# Patient Record
Sex: MALE | Race: WHITE
[De-identification: names, ages, dates, MRNs, and addresses within clinical notes are randomized per-mention and may not be internally consistent; named-entity substitution may affect disease eponyms.]

---

## 2019-01-23 NOTE — NUR
01/23/19 1724 Mihaela Castillo
AT 1620 CHECKED DRSG. DRSG DRY/INTACT, REWRAPPED ACE WRAP SNUGLY ON
CHEST. PT TOLERATED WELL.

## 2019-06-12 ENCOUNTER — HOSPITAL ENCOUNTER (OUTPATIENT)
Dept: HOSPITAL 95 - ORSCSDS | Age: 69
Discharge: HOME | End: 2019-06-12
Attending: INTERNAL MEDICINE
Payer: MEDICARE

## 2019-06-12 VITALS — HEIGHT: 70 IN | BODY MASS INDEX: 26.45 KG/M2 | WEIGHT: 184.75 LBS

## 2019-06-12 DIAGNOSIS — Z79.899: ICD-10-CM

## 2019-06-12 DIAGNOSIS — I10: ICD-10-CM

## 2019-06-12 DIAGNOSIS — D12.4: ICD-10-CM

## 2019-06-12 DIAGNOSIS — Z86.010: Primary | ICD-10-CM

## 2019-06-12 DIAGNOSIS — E78.00: ICD-10-CM

## 2019-06-12 DIAGNOSIS — K57.30: ICD-10-CM

## 2019-06-12 DIAGNOSIS — Z87.891: ICD-10-CM

## 2019-06-12 DIAGNOSIS — D64.9: ICD-10-CM

## 2019-06-12 DIAGNOSIS — K21.9: ICD-10-CM

## 2019-06-12 DIAGNOSIS — G47.33: ICD-10-CM

## 2019-06-12 DIAGNOSIS — I25.10: ICD-10-CM

## 2019-06-12 DIAGNOSIS — D12.3: ICD-10-CM

## 2019-06-12 DIAGNOSIS — Z86.73: ICD-10-CM

## 2019-06-12 DIAGNOSIS — Z79.01: ICD-10-CM

## 2019-06-12 PROCEDURE — 0DBL8ZX EXCISION OF TRANSVERSE COLON, VIA NATURAL OR ARTIFICIAL OPENING ENDOSCOPIC, DIAGNOSTIC: ICD-10-PCS | Performed by: INTERNAL MEDICINE

## 2019-06-12 PROCEDURE — 0DBM8ZX EXCISION OF DESCENDING COLON, VIA NATURAL OR ARTIFICIAL OPENING ENDOSCOPIC, DIAGNOSTIC: ICD-10-PCS | Performed by: INTERNAL MEDICINE

## 2020-01-26 ENCOUNTER — HOSPITAL ENCOUNTER (OUTPATIENT)
Dept: HOSPITAL 95 - LAB | Age: 70
End: 2020-01-26
Attending: INTERNAL MEDICINE
Payer: MEDICARE

## 2020-01-26 DIAGNOSIS — R93.1: ICD-10-CM

## 2020-01-26 DIAGNOSIS — R07.9: Primary | ICD-10-CM

## 2020-01-26 LAB
ANION GAP SERPL CALCULATED.4IONS-SCNC: 6 MMOL/L (ref 6–16)
BASOPHILS # BLD AUTO: 0.03 K/MM3 (ref 0–0.23)
BASOPHILS NFR BLD AUTO: 0 % (ref 0–2)
BUN SERPL-MCNC: 16 MG/DL (ref 8–24)
CALCIUM SERPL-MCNC: 8.9 MG/DL (ref 8.5–10.1)
CHLORIDE SERPL-SCNC: 108 MMOL/L (ref 98–108)
CO2 SERPL-SCNC: 26 MMOL/L (ref 21–32)
CREAT SERPL-MCNC: 1.51 MG/DL (ref 0.6–1.2)
DEPRECATED RDW RBC AUTO: 41.1 FL (ref 35.1–46.3)
EOSINOPHIL # BLD AUTO: 0.16 K/MM3 (ref 0–0.68)
EOSINOPHIL NFR BLD AUTO: 2 % (ref 0–6)
ERYTHROCYTE [DISTWIDTH] IN BLOOD BY AUTOMATED COUNT: 13.5 % (ref 11.7–14.2)
GLUCOSE SERPL-MCNC: 81 MG/DL (ref 70–99)
HCT VFR BLD AUTO: 36.5 % (ref 37–53)
HGB BLD-MCNC: 12.5 G/DL (ref 13.5–17.5)
IMM GRANULOCYTES # BLD AUTO: 0.06 K/MM3 (ref 0–0.1)
IMM GRANULOCYTES NFR BLD AUTO: 1 % (ref 0–1)
LYMPHOCYTES # BLD AUTO: 2.37 K/MM3 (ref 0.84–5.2)
LYMPHOCYTES NFR BLD AUTO: 33 % (ref 21–46)
MCHC RBC AUTO-ENTMCNC: 34.2 G/DL (ref 31.5–36.5)
MCV RBC AUTO: 84 FL (ref 80–100)
MONOCYTES # BLD AUTO: 0.58 K/MM3 (ref 0.16–1.47)
MONOCYTES NFR BLD AUTO: 8 % (ref 4–13)
NEUTROPHILS # BLD AUTO: 3.91 K/MM3 (ref 1.96–9.15)
NEUTROPHILS NFR BLD AUTO: 55 % (ref 41–73)
NRBC # BLD AUTO: 0 K/MM3 (ref 0–0.02)
NRBC BLD AUTO-RTO: 0 /100 WBC (ref 0–0.2)
PLATELET # BLD AUTO: 156 K/MM3 (ref 150–400)
POTASSIUM SERPL-SCNC: 3.7 MMOL/L (ref 3.5–5.5)
PROTHROMBIN TIME: 11.5 SEC (ref 9.7–11.5)
SODIUM SERPL-SCNC: 140 MMOL/L (ref 136–145)

## 2020-01-27 ENCOUNTER — HOSPITAL ENCOUNTER (OUTPATIENT)
Dept: HOSPITAL 95 - MHTC | Age: 70
Discharge: HOME | End: 2020-01-27
Attending: INTERNAL MEDICINE
Payer: MEDICARE

## 2020-01-27 VITALS — WEIGHT: 200.4 LBS | HEIGHT: 70 IN | BODY MASS INDEX: 28.69 KG/M2

## 2020-01-27 DIAGNOSIS — Z79.899: ICD-10-CM

## 2020-01-27 DIAGNOSIS — I10: ICD-10-CM

## 2020-01-27 DIAGNOSIS — Z87.891: ICD-10-CM

## 2020-01-27 DIAGNOSIS — Z88.8: ICD-10-CM

## 2020-01-27 DIAGNOSIS — Z88.5: ICD-10-CM

## 2020-01-27 DIAGNOSIS — I25.82: ICD-10-CM

## 2020-01-27 DIAGNOSIS — I25.119: Primary | ICD-10-CM

## 2020-01-27 DIAGNOSIS — I25.729: ICD-10-CM

## 2020-01-27 DIAGNOSIS — Z88.6: ICD-10-CM

## 2020-01-27 DIAGNOSIS — E78.5: ICD-10-CM

## 2020-01-27 PROCEDURE — 4A023N7 MEASUREMENT OF CARDIAC SAMPLING AND PRESSURE, LEFT HEART, PERCUTANEOUS APPROACH: ICD-10-PCS | Performed by: INTERNAL MEDICINE

## 2020-01-27 PROCEDURE — B201YZZ PLAIN RADIOGRAPHY OF MULTIPLE CORONARY ARTERIES USING OTHER CONTRAST: ICD-10-PCS | Performed by: INTERNAL MEDICINE

## 2020-01-27 PROCEDURE — C1894 INTRO/SHEATH, NON-LASER: HCPCS

## 2020-01-27 PROCEDURE — B203YZZ PLAIN RADIOGRAPHY OF MULTIPLE CORONARY ARTERY BYPASS GRAFTS USING OTHER CONTRAST: ICD-10-PCS | Performed by: INTERNAL MEDICINE

## 2020-01-27 PROCEDURE — C1769 GUIDE WIRE: HCPCS

## 2020-01-27 NOTE — NUR
DISCHARGE
PT DRESSED SELF WITH ASSISSENCE FROM SPOUSE. PT DENIES ANY PAIN. VSS. R
FEMORAL ACCESS SITE WITH UTE DRESSING. NO BLEEDING, OOZING OR HEMATOMA
NOTED. PT REPORTS TENDERNESS BUT DENIES ANY PAIN TO AREA. PT AND SPOUSE STATE
THEIR UNDERSTANDING OF SITE CARE INSTRUCTIONS AND DISCHARGE INSTRUCTIONS AND
DENY ANY QUESTIONS OR CONCERNS. IV DCD WITH CATH INTACT. PT TO FRONT ENTERENCE
VIA WHEELCHAIR AND ASSISTED INTO VEHICLE.

## 2020-01-27 NOTE — NUR
PT AMBULATES TO RESTROOM MULTIPLE TIMES. PT REPORTS URINATING WITHOUT
DIFFICULTIES. PT ALSO REPORTS HAVING A BM WITHOUT DIFFICULITIES. VS. NO
BLEEDING, OOZING OR HEMATOMA NOTED AT R FEMORAL ACCESS SITE. WILL CONTINUR TO
MONITOR.

## 2022-07-21 ENCOUNTER — HOSPITAL ENCOUNTER (INPATIENT)
Dept: HOSPITAL 95 - ER | Age: 72
LOS: 3 days | Discharge: HOME | DRG: 378 | End: 2022-07-24
Attending: COUNSELOR | Admitting: HOSPITALIST
Payer: COMMERCIAL

## 2022-07-21 VITALS — BODY MASS INDEX: 26.51 KG/M2 | HEIGHT: 69 IN | WEIGHT: 179.02 LBS

## 2022-07-21 DIAGNOSIS — Z88.6: ICD-10-CM

## 2022-07-21 DIAGNOSIS — I10: ICD-10-CM

## 2022-07-21 DIAGNOSIS — Z88.8: ICD-10-CM

## 2022-07-21 DIAGNOSIS — D68.59: ICD-10-CM

## 2022-07-21 DIAGNOSIS — G89.4: ICD-10-CM

## 2022-07-21 DIAGNOSIS — Z87.891: ICD-10-CM

## 2022-07-21 DIAGNOSIS — D62: ICD-10-CM

## 2022-07-21 DIAGNOSIS — Z98.1: ICD-10-CM

## 2022-07-21 DIAGNOSIS — F11.20: ICD-10-CM

## 2022-07-21 DIAGNOSIS — E78.5: ICD-10-CM

## 2022-07-21 DIAGNOSIS — Z98.890: ICD-10-CM

## 2022-07-21 DIAGNOSIS — Z95.1: ICD-10-CM

## 2022-07-21 DIAGNOSIS — F41.9: ICD-10-CM

## 2022-07-21 DIAGNOSIS — D72.828: ICD-10-CM

## 2022-07-21 DIAGNOSIS — Z79.01: ICD-10-CM

## 2022-07-21 DIAGNOSIS — Z95.4: ICD-10-CM

## 2022-07-21 DIAGNOSIS — Z90.49: ICD-10-CM

## 2022-07-21 DIAGNOSIS — Z66: ICD-10-CM

## 2022-07-21 DIAGNOSIS — K31.82: Primary | ICD-10-CM

## 2022-07-21 DIAGNOSIS — K59.09: ICD-10-CM

## 2022-07-21 DIAGNOSIS — Z79.899: ICD-10-CM

## 2022-07-21 DIAGNOSIS — Z88.5: ICD-10-CM

## 2022-07-21 DIAGNOSIS — Z90.89: ICD-10-CM

## 2022-07-21 DIAGNOSIS — I25.10: ICD-10-CM

## 2022-07-21 LAB
ALBUMIN SERPL BCP-MCNC: 3.7 G/DL (ref 3.4–5)
ALBUMIN/GLOB SERPL: 1 {RATIO} (ref 0.8–1.8)
ALT SERPL W P-5'-P-CCNC: 21 U/L (ref 12–78)
ANION GAP SERPL CALCULATED.4IONS-SCNC: 11 MMOL/L (ref 6–16)
AST SERPL W P-5'-P-CCNC: 26 U/L (ref 12–37)
BASOPHILS # BLD AUTO: 0.04 K/MM3 (ref 0–0.23)
BASOPHILS NFR BLD AUTO: 0 % (ref 0–2)
BILIRUB SERPL-MCNC: 0.9 MG/DL (ref 0.1–1)
BUN SERPL-MCNC: 25 MG/DL (ref 8–24)
CALCIUM SERPL-MCNC: 8.3 MG/DL (ref 8.5–10.1)
CHLORIDE SERPL-SCNC: 102 MMOL/L (ref 98–108)
CO2 SERPL-SCNC: 20 MMOL/L (ref 21–32)
CREAT SERPL-MCNC: 1.25 MG/DL (ref 0.6–1.2)
DEPRECATED RDW RBC AUTO: 42.2 FL (ref 35.1–46.3)
EOSINOPHIL # BLD AUTO: 0.04 K/MM3 (ref 0–0.68)
EOSINOPHIL NFR BLD AUTO: 0 % (ref 0–6)
ERYTHROCYTE [DISTWIDTH] IN BLOOD BY AUTOMATED COUNT: 14.3 % (ref 11.7–14.2)
GLOBULIN SER CALC-MCNC: 3.6 G/DL (ref 2.2–4)
GLUCOSE SERPL-MCNC: 147 MG/DL (ref 70–99)
HCT VFR BLD AUTO: 29.5 % (ref 37–53)
HCT VFR BLD AUTO: 30.8 % (ref 37–53)
HCT VFR BLD AUTO: 34 % (ref 37–53)
HGB BLD-MCNC: 10.5 G/DL (ref 13.5–17.5)
HGB BLD-MCNC: 10.6 G/DL (ref 13.5–17.5)
HGB BLD-MCNC: 11.8 G/DL (ref 13.5–17.5)
IMM GRANULOCYTES # BLD AUTO: 0.06 K/MM3 (ref 0–0.1)
IMM GRANULOCYTES NFR BLD AUTO: 1 % (ref 0–1)
KETONES UR STRIP-MCNC: (no result) MG/DL
LYMPHOCYTES # BLD AUTO: 2.11 K/MM3 (ref 0.84–5.2)
LYMPHOCYTES NFR BLD AUTO: 18 % (ref 21–46)
MCHC RBC AUTO-ENTMCNC: 34.7 G/DL (ref 31.5–36.5)
MCV RBC AUTO: 82 FL (ref 80–100)
MONOCYTES # BLD AUTO: 0.93 K/MM3 (ref 0.16–1.47)
MONOCYTES NFR BLD AUTO: 8 % (ref 4–13)
NEUTROPHILS # BLD AUTO: 8.9 K/MM3 (ref 1.96–9.15)
NEUTROPHILS NFR BLD AUTO: 74 % (ref 41–73)
NRBC # BLD AUTO: 0 K/MM3 (ref 0–0.02)
NRBC BLD AUTO-RTO: 0 /100 WBC (ref 0–0.2)
PLATELET # BLD AUTO: 266 K/MM3 (ref 150–400)
POTASSIUM SERPL-SCNC: 4 MMOL/L (ref 3.5–5.5)
PROT SERPL-MCNC: 7.3 G/DL (ref 6.4–8.2)
PROT UR STRIP-MCNC: (no result) MG/DL
PROTHROMBIN TIME: 52.4 SEC (ref 9.7–11.5)
SODIUM SERPL-SCNC: 133 MMOL/L (ref 136–145)
SP GR SPEC: 1.02 (ref 1–1.02)
UROBILINOGEN UR STRIP-MCNC: (no result) MG/DL

## 2022-07-21 PROCEDURE — A9270 NON-COVERED ITEM OR SERVICE: HCPCS

## 2022-07-21 PROCEDURE — C9113 INJ PANTOPRAZOLE SODIUM, VIA: HCPCS

## 2022-07-21 NOTE — NUR
PATIENT IS ALERT AND ORIENTED AND COOPERATIVE WITH CARE. DAUGHTER AT THE
BEDSIDE. C/O LLQ ABDOMINAL PAIN UPON PALPATION. NO BM TODAY. PATIENT IS NPO
EXCEPT WATER. C/O HIP PAIN, WILL MEDICATE PER EMAR. PATIENT STATES NO NAUSEA
TODAY. WILL CONTINUE TO MONITOR

## 2022-07-21 NOTE — NUR
DR. RUBIO AT THE BEDSIDE. PATIENT'S S/O AT THE BEDSIDE. PLAN IS FOR UPPER
ENDOSCOPY ONCE INR IS AT 2.5.

## 2022-07-22 LAB
ALBUMIN SERPL BCP-MCNC: 3 G/DL (ref 3.4–5)
ALBUMIN/GLOB SERPL: 1.1 {RATIO} (ref 0.8–1.8)
ALT SERPL W P-5'-P-CCNC: 15 U/L (ref 12–78)
ANION GAP SERPL CALCULATED.4IONS-SCNC: 6 MMOL/L (ref 6–16)
AST SERPL W P-5'-P-CCNC: 18 U/L (ref 12–37)
BASOPHILS # BLD AUTO: 0.03 K/MM3 (ref 0–0.23)
BASOPHILS NFR BLD AUTO: 0 % (ref 0–2)
BILIRUB SERPL-MCNC: 0.7 MG/DL (ref 0.1–1)
BUN SERPL-MCNC: 18 MG/DL (ref 8–24)
CALCIUM SERPL-MCNC: 7.5 MG/DL (ref 8.5–10.1)
CHLORIDE SERPL-SCNC: 110 MMOL/L (ref 98–108)
CO2 SERPL-SCNC: 24 MMOL/L (ref 21–32)
CREAT SERPL-MCNC: 1.02 MG/DL (ref 0.6–1.2)
DEPRECATED RDW RBC AUTO: 42.3 FL (ref 35.1–46.3)
EOSINOPHIL # BLD AUTO: 0.06 K/MM3 (ref 0–0.68)
EOSINOPHIL NFR BLD AUTO: 1 % (ref 0–6)
ERYTHROCYTE [DISTWIDTH] IN BLOOD BY AUTOMATED COUNT: 14.4 % (ref 11.7–14.2)
GLOBULIN SER CALC-MCNC: 2.7 G/DL (ref 2.2–4)
GLUCOSE SERPL-MCNC: 115 MG/DL (ref 70–99)
HCT VFR BLD AUTO: 23.4 % (ref 37–53)
HCT VFR BLD AUTO: 24.6 % (ref 37–53)
HCT VFR BLD AUTO: 25.1 % (ref 37–53)
HCT VFR BLD AUTO: 25.5 % (ref 37–53)
HCT VFR BLD AUTO: 26 % (ref 37–53)
HCT VFR BLD AUTO: 28.3 % (ref 37–53)
HGB BLD-MCNC: 8.2 G/DL (ref 13.5–17.5)
HGB BLD-MCNC: 8.5 G/DL (ref 13.5–17.5)
HGB BLD-MCNC: 8.6 G/DL (ref 13.5–17.5)
HGB BLD-MCNC: 8.6 G/DL (ref 13.5–17.5)
HGB BLD-MCNC: 8.8 G/DL (ref 13.5–17.5)
HGB BLD-MCNC: 9.6 G/DL (ref 13.5–17.5)
IMM GRANULOCYTES # BLD AUTO: 0.03 K/MM3 (ref 0–0.1)
IMM GRANULOCYTES NFR BLD AUTO: 0 % (ref 0–1)
LYMPHOCYTES # BLD AUTO: 2.4 K/MM3 (ref 0.84–5.2)
LYMPHOCYTES NFR BLD AUTO: 33 % (ref 21–46)
MCHC RBC AUTO-ENTMCNC: 34.5 G/DL (ref 31.5–36.5)
MCV RBC AUTO: 83 FL (ref 80–100)
MONOCYTES # BLD AUTO: 0.49 K/MM3 (ref 0.16–1.47)
MONOCYTES NFR BLD AUTO: 7 % (ref 4–13)
NEUTROPHILS # BLD AUTO: 4.32 K/MM3 (ref 1.96–9.15)
NEUTROPHILS NFR BLD AUTO: 59 % (ref 41–73)
NRBC # BLD AUTO: 0 K/MM3 (ref 0–0.02)
NRBC BLD AUTO-RTO: 0 /100 WBC (ref 0–0.2)
PLATELET # BLD AUTO: 161 K/MM3 (ref 150–400)
POTASSIUM SERPL-SCNC: 3.8 MMOL/L (ref 3.5–5.5)
PROT SERPL-MCNC: 5.7 G/DL (ref 6.4–8.2)
PROTHROMBIN TIME: 40.6 SEC (ref 9.7–11.5)
SODIUM SERPL-SCNC: 140 MMOL/L (ref 136–145)

## 2022-07-22 NOTE — NUR
SHIFT SUMMARY
PT AXO, PLEASANT AND COOPERATIVE WITH CARE THOUGH IRRITABLE TOWARDS THE END OF
SHIFT. PT STATES THAT HE'S "AMPING UP" R/T PAIN AND HUNGER. DR SWARTZ CALLED AT
1800 WHO UPGRADED PT DIET TO [facility] SOFT DIET. VSS.
PT EXTREMELY Yocha Dehe AT BEGINNING OF SHIFT THEN HE STATED AT ABOUT 9 AM THAT HE
PULLED OUT A SECTION OF COTTON BALL FROM HIS RIGHT EAR THAT HE FORGOT HE
PUSHED "WAY DOWN" IN HIS EAR TO STOP SOME BLEEDING ABOUT 6 MONTHS AGO. PT
STATED THE COTTON WAS SATURATED IN OLD BLOOD AND EAR WX BUT REPORTS HIS
HEARING IS EXTREMELY IMPROVED.
PT CONTINUES TO REPORT BLACK STOOL. SEE H&H LABS.
MEDICATED PER EMAR FOR CHRONIC PAIN AND PAIN TO LLQ.
BED IN LOW POSITION, CALL LIGHT WITHIN REACH.
UP WITH SBA TO BATHROOM TO VOID.

## 2022-07-22 NOTE — NUR
SHIFT SUMMARY
PT SLEPT OFF AND ON THIS EVENING. BECOMES UNCOMFORTABLE EASILY REMAINING IN
BED RELATED TO CHRONIC PAIN. PT REPOSITIONS SELF IN BED. MEDICATED PER EMAR.
PT DOES CONTINUE TO REPORT SOME MILD DIZZINESS INITIALLY WITH SITTING UP AT
SIDE OF THE BED BUT IT RESOLVES QUICKLY AND PT WAITS FOR IT TO RESOLVE BEFORE
STANDING. NO N/V OR BOWEL MOVEMENTS THIS EVENING. HOWEVER, HGB DID DROP FROM
10.5 TO 8.8. REDRAW AGAIN THIS AM. INR IMPROVED TO 4.25 FROM 5.58. PT HAS
BEEN SNACKING ON CLEAR LIQUID DIET THROUGH THE NIGHT, TOLERATING WELL. NS
TRANSFUSING /HR.

## 2022-07-23 LAB
ALBUMIN SERPL BCP-MCNC: 2.9 G/DL (ref 3.4–5)
ALBUMIN/GLOB SERPL: 1.1 {RATIO} (ref 0.8–1.8)
ALT SERPL W P-5'-P-CCNC: 16 U/L (ref 12–78)
ANION GAP SERPL CALCULATED.4IONS-SCNC: 4 MMOL/L (ref 6–16)
AST SERPL W P-5'-P-CCNC: 15 U/L (ref 12–37)
BASOPHILS # BLD AUTO: 0.02 K/MM3 (ref 0–0.23)
BASOPHILS NFR BLD AUTO: 0 % (ref 0–2)
BILIRUB SERPL-MCNC: 0.5 MG/DL (ref 0.1–1)
BUN SERPL-MCNC: 15 MG/DL (ref 8–24)
CALCIUM SERPL-MCNC: 8.2 MG/DL (ref 8.5–10.1)
CHLORIDE SERPL-SCNC: 112 MMOL/L (ref 98–108)
CO2 SERPL-SCNC: 24 MMOL/L (ref 21–32)
CREAT SERPL-MCNC: 1.01 MG/DL (ref 0.6–1.2)
DEPRECATED RDW RBC AUTO: 45.3 FL (ref 35.1–46.3)
EOSINOPHIL # BLD AUTO: 0.08 K/MM3 (ref 0–0.68)
EOSINOPHIL NFR BLD AUTO: 1 % (ref 0–6)
ERYTHROCYTE [DISTWIDTH] IN BLOOD BY AUTOMATED COUNT: 15 % (ref 11.7–14.2)
GLOBULIN SER CALC-MCNC: 2.7 G/DL (ref 2.2–4)
GLUCOSE SERPL-MCNC: 96 MG/DL (ref 70–99)
HCT VFR BLD AUTO: 23.4 % (ref 37–53)
HCT VFR BLD AUTO: 23.8 % (ref 37–53)
HGB BLD-MCNC: 7.8 G/DL (ref 13.5–17.5)
HGB BLD-MCNC: 8 G/DL (ref 13.5–17.5)
IMM GRANULOCYTES # BLD AUTO: 0.04 K/MM3 (ref 0–0.1)
IMM GRANULOCYTES NFR BLD AUTO: 1 % (ref 0–1)
LYMPHOCYTES # BLD AUTO: 1.9 K/MM3 (ref 0.84–5.2)
LYMPHOCYTES NFR BLD AUTO: 33 % (ref 21–46)
MCHC RBC AUTO-ENTMCNC: 33.6 G/DL (ref 31.5–36.5)
MCV RBC AUTO: 85 FL (ref 80–100)
MONOCYTES # BLD AUTO: 0.43 K/MM3 (ref 0.16–1.47)
MONOCYTES NFR BLD AUTO: 8 % (ref 4–13)
NEUTROPHILS # BLD AUTO: 3.22 K/MM3 (ref 1.96–9.15)
NEUTROPHILS NFR BLD AUTO: 57 % (ref 41–73)
NRBC # BLD AUTO: 0 K/MM3 (ref 0–0.02)
NRBC BLD AUTO-RTO: 0 /100 WBC (ref 0–0.2)
PLATELET # BLD AUTO: 159 K/MM3 (ref 150–400)
POTASSIUM SERPL-SCNC: 4.8 MMOL/L (ref 3.5–5.5)
PROT SERPL-MCNC: 5.6 G/DL (ref 6.4–8.2)
PROTHROMBIN TIME: 29.2 SEC (ref 9.7–11.5)
SODIUM SERPL-SCNC: 140 MMOL/L (ref 136–145)

## 2022-07-23 NOTE — NUR
NIGHT SHIFT SUMMARY
 
ADMITTED FOR UPPER GI BLEED. PT IS A DNR. AWAITING SCOPE BY DR RUBIO WHEN INR
IS BELOW 2.5. PT REFUSED A BLOOD DRAW LAST NIGHT DUE TO H&H CHECKS EVERY FOUR
HOURS. SPOKE WITH PT ABOUT THE NEED FOR BLOOD DRAWS BUT HE WAS VISIBLY UPSET
AND AGITATED. PT CALMED WITH DISCUSSION BUT STILL CONFUSED ABOUT LABS. PT IS
STILL HAVING DARK AND TARRY STOOLS AND SOME LLQ ABDOMINAL PAIN. INR IS DOWN TO
2.99 THIS AM. HGB DROPPED TO 8.0. PT APPEARS MORE PALE AND REPORTS SOME
INCREASED SOB AND WEAKNESS WHEN UP. PT TO FOLLOW UP FOR OUTPT COLONOSCOPY WITH
DR EDGE. MEDICATED WITH SCHEDULED OXY DUE TO WORSENING RIGHT HIP AND LEG
PAIN.

## 2022-07-23 NOTE — NUR
PT PLEASANT COOP A/O TODAY. DENIES PAIN AT THIS TIME. STATES SOME DARK STOOLS
LAST AILYAH. NO DAKOTA BLOOD NOTED. H/R REG. NO MURMUR NOTED. VALVE CLICK NOTED.
LUNGS CLEAR, RESP EASY, UNLABORED. ON R/A. BT X4 LAST BM LAST NITE PER PT.
VOIDS SBA TO BATHROOM. BED IN LOW POSITION, CALL LITE IN REACH, CALLS APPROP

## 2022-07-23 NOTE — NUR
PT PLEASANT TODAY. DR RUBIO MADE ORDERS TO HAVE HIM NPO AFTER DINNER, EXCEPT
WATER AND MEDS. THEN FULL NPO AT 6AM. NOTE ON DOOR AND ON BOARD FOR REMINDER.
PT HAD DARK STOOL TODAY. NO DAKOTA BLOOD NOTED. CONTINUES TO BE ON R/A . SBA TO
BATHROOM. BED IN LOW POSITION, CALL LITE IN REACH CALLS APPROP

## 2022-07-24 LAB
ALBUMIN SERPL BCP-MCNC: 2.7 G/DL (ref 3.4–5)
ALBUMIN/GLOB SERPL: 1 {RATIO} (ref 0.8–1.8)
ALT SERPL W P-5'-P-CCNC: 14 U/L (ref 12–78)
ANION GAP SERPL CALCULATED.4IONS-SCNC: 4 MMOL/L (ref 6–16)
AST SERPL W P-5'-P-CCNC: 15 U/L (ref 12–37)
BILIRUB SERPL-MCNC: 0.7 MG/DL (ref 0.1–1)
BUN SERPL-MCNC: 18 MG/DL (ref 8–24)
CALCIUM SERPL-MCNC: 7.9 MG/DL (ref 8.5–10.1)
CHLORIDE SERPL-SCNC: 111 MMOL/L (ref 98–108)
CO2 SERPL-SCNC: 24 MMOL/L (ref 21–32)
CREAT SERPL-MCNC: 1.24 MG/DL (ref 0.6–1.2)
GLOBULIN SER CALC-MCNC: 2.7 G/DL (ref 2.2–4)
GLUCOSE SERPL-MCNC: 99 MG/DL (ref 70–99)
HCT VFR BLD AUTO: 22.8 % (ref 37–53)
HGB BLD-MCNC: 7.5 G/DL (ref 13.5–17.5)
POTASSIUM SERPL-SCNC: 4.2 MMOL/L (ref 3.5–5.5)
PROT SERPL-MCNC: 5.4 G/DL (ref 6.4–8.2)
PROTHROMBIN TIME: 15.1 SEC (ref 9.7–11.5)
SODIUM SERPL-SCNC: 139 MMOL/L (ref 136–145)

## 2022-07-24 PROCEDURE — 0W3P8ZZ CONTROL BLEEDING IN GASTROINTESTINAL TRACT, VIA NATURAL OR ARTIFICIAL OPENING ENDOSCOPIC: ICD-10-PCS | Performed by: INTERNAL MEDICINE

## 2022-07-24 NOTE — NUR
07/24/22 0818 Roro Delvalle
MONITOR INTACT WITH CONTINUOUS PULSE OXIMETRY AND INTERMITTENT BP.
REFER TO DR BORRERO'S ANESTHESIA RECORD.

## 2022-07-24 NOTE — NUR
PT DISCHARGE REVIEWED WITH PT. MEDS DISCUSSED. PT HAS ADMIN LOVENOX IN PAST.
PT VERBALIZED UNDERSTANDING MEDS AND INST.. LEAVING IV IN UNTIL D/CHARGE AFTER
LOVENOX GIVEN TOMER AT 9PM

## 2022-07-24 NOTE — NUR
0905  FINISHED ENDOSCOPY. PANCHITO LIZAMA CALLED WITH REPORT. STATES SMALL
INTESTINE BLEED. CLIPPED. PT TO COME HOME. DIET TO FOLLOW.

## 2022-07-24 NOTE — NUR
PT TO DISCHARGE THIS ALIYAH. WE ARE UNABLE TO DISPENSE LOVENOX FOR NITE TIME DOSE
FOR PT. HE WILL INSTEAD STAY HERE UNTIL JUST AFTER DOSE AND SPOUSE TO 
AND TAKE HOME 9 PM. PHARMACY STATES THIS IS EARLIEST SAFE TO GIVE TODAY.  WILL
HAVE D/CHARGE DONE IN ADVANCE. PT STATES FEELS BETTER. CONTINUES TO AMBULATE
SELF TO BATHROOM. BED IN LOW POSITION, CALL LITE IN REACH, CALLS APROP

## 2022-07-24 NOTE — NUR
History, Chart, Medications and Allergies reviewed before start of
procedure. Patient confirms NPO status and agrees with scheduled surgery.
Lungs clear T/O to Auscultation.

## 2022-07-24 NOTE — NUR
SHIUFT SUMMARY: PATIENT IS A&OX4, UP TO THE BATHROOM WITH SBAX1 AND FWW.
CHRONIC BACK PAIN IS WELL CONTROLED WITH SCEDULED OXYCONTIN. IVF INFUSING PER
MD ORDER. PATIENT HAS BEEN NPO SINCE 0600 WITH ONLY SIPS OF WATER SINCE AFTER
DINNER LAST NIGHT.

## 2022-07-25 NOTE — NUR
Patient discharged via W/C with all belongings after evening meds given.  IV
discontinued without problems.  Pt picked up by S.O. at ER entrance.  Pt
without any questions at this time.  Reinforced some discharge teaching.

## 2022-11-23 ENCOUNTER — HOSPITAL ENCOUNTER (OUTPATIENT)
Dept: HOSPITAL 95 - ATC | Age: 72
Discharge: HOME | End: 2022-11-23
Attending: HOSPITALIST
Payer: COMMERCIAL

## 2022-11-23 DIAGNOSIS — I10: ICD-10-CM

## 2022-11-23 DIAGNOSIS — I25.10: ICD-10-CM

## 2022-11-23 DIAGNOSIS — Z88.8: ICD-10-CM

## 2022-11-23 DIAGNOSIS — N18.9: ICD-10-CM

## 2022-11-23 DIAGNOSIS — G47.33: ICD-10-CM

## 2022-11-23 DIAGNOSIS — Z79.01: ICD-10-CM

## 2022-11-23 DIAGNOSIS — J43.9: ICD-10-CM

## 2022-11-23 DIAGNOSIS — D50.9: Primary | ICD-10-CM

## 2022-11-23 DIAGNOSIS — E78.5: ICD-10-CM

## 2022-11-23 DIAGNOSIS — Z79.02: ICD-10-CM

## 2022-11-23 DIAGNOSIS — Z95.1: ICD-10-CM

## 2022-11-23 DIAGNOSIS — Z88.5: ICD-10-CM

## 2022-11-24 ENCOUNTER — HOSPITAL ENCOUNTER (OUTPATIENT)
Dept: HOSPITAL 95 - ATC | Age: 72
Discharge: HOME | End: 2022-11-24
Attending: HOSPITALIST
Payer: COMMERCIAL

## 2022-11-24 DIAGNOSIS — Z79.01: ICD-10-CM

## 2022-11-24 DIAGNOSIS — Z95.2: ICD-10-CM

## 2022-11-24 DIAGNOSIS — E78.5: ICD-10-CM

## 2022-11-24 DIAGNOSIS — J43.9: ICD-10-CM

## 2022-11-24 DIAGNOSIS — Z79.02: ICD-10-CM

## 2022-11-24 DIAGNOSIS — I25.10: ICD-10-CM

## 2022-11-24 DIAGNOSIS — Z88.5: ICD-10-CM

## 2022-11-24 DIAGNOSIS — G47.33: ICD-10-CM

## 2022-11-24 DIAGNOSIS — I12.9: ICD-10-CM

## 2022-11-24 DIAGNOSIS — Z95.5: ICD-10-CM

## 2022-11-24 DIAGNOSIS — N18.30: ICD-10-CM

## 2022-11-24 DIAGNOSIS — D50.9: Primary | ICD-10-CM

## 2022-11-24 DIAGNOSIS — Z88.8: ICD-10-CM

## 2022-11-24 DIAGNOSIS — Z95.1: ICD-10-CM

## 2022-11-25 ENCOUNTER — HOSPITAL ENCOUNTER (OUTPATIENT)
Dept: HOSPITAL 95 - ATC | Age: 72
Discharge: HOME | End: 2022-11-25
Attending: HOSPITALIST
Payer: COMMERCIAL

## 2022-11-25 DIAGNOSIS — I25.10: ICD-10-CM

## 2022-11-25 DIAGNOSIS — Z88.5: ICD-10-CM

## 2022-11-25 DIAGNOSIS — N18.30: ICD-10-CM

## 2022-11-25 DIAGNOSIS — K59.09: ICD-10-CM

## 2022-11-25 DIAGNOSIS — F41.9: ICD-10-CM

## 2022-11-25 DIAGNOSIS — Z88.8: ICD-10-CM

## 2022-11-25 DIAGNOSIS — E78.5: ICD-10-CM

## 2022-11-25 DIAGNOSIS — I12.9: ICD-10-CM

## 2022-11-25 DIAGNOSIS — D50.9: Primary | ICD-10-CM

## 2022-11-25 LAB — PROTHROMBIN TIME: 31.6 SEC (ref 9.7–11.5)
